# Patient Record
Sex: MALE | NOT HISPANIC OR LATINO | URBAN - METROPOLITAN AREA
[De-identification: names, ages, dates, MRNs, and addresses within clinical notes are randomized per-mention and may not be internally consistent; named-entity substitution may affect disease eponyms.]

---

## 2021-12-12 ENCOUNTER — INPATIENT (INPATIENT)
Facility: HOSPITAL | Age: 36
LOS: 1 days | Discharge: HOME | DRG: 342 | End: 2021-12-14
Attending: SURGERY | Admitting: SURGERY
Payer: COMMERCIAL

## 2021-12-12 VITALS
OXYGEN SATURATION: 96 % | RESPIRATION RATE: 18 BRPM | HEART RATE: 85 BPM | SYSTOLIC BLOOD PRESSURE: 102 MMHG | HEIGHT: 66 IN | DIASTOLIC BLOOD PRESSURE: 69 MMHG | WEIGHT: 154.98 LBS | TEMPERATURE: 99 F

## 2021-12-12 LAB
ALBUMIN SERPL ELPH-MCNC: 4.8 G/DL — SIGNIFICANT CHANGE UP (ref 3.3–5)
ALP SERPL-CCNC: 105 U/L — SIGNIFICANT CHANGE UP (ref 40–120)
ALT FLD-CCNC: 130 U/L — HIGH (ref 10–45)
ANION GAP SERPL CALC-SCNC: 13 MMOL/L — SIGNIFICANT CHANGE UP (ref 5–17)
ANISOCYTOSIS BLD QL: SLIGHT — SIGNIFICANT CHANGE UP
APPEARANCE UR: ABNORMAL
APTT BLD: 29.9 SEC — SIGNIFICANT CHANGE UP (ref 27.5–35.5)
AST SERPL-CCNC: 112 U/L — HIGH (ref 10–40)
BACTERIA # UR AUTO: PRESENT /HPF
BASOPHILS # BLD AUTO: 0.1 K/UL — SIGNIFICANT CHANGE UP (ref 0–0.2)
BASOPHILS NFR BLD AUTO: 0.9 % — SIGNIFICANT CHANGE UP (ref 0–2)
BILIRUB DIRECT SERPL-MCNC: 0.3 MG/DL — SIGNIFICANT CHANGE UP (ref 0–0.3)
BILIRUB INDIRECT FLD-MCNC: 1.1 MG/DL — HIGH (ref 0.2–1)
BILIRUB SERPL-MCNC: 1.4 MG/DL — HIGH (ref 0.2–1.2)
BILIRUB SERPL-MCNC: 1.5 MG/DL — HIGH (ref 0.2–1.2)
BILIRUB UR-MCNC: NEGATIVE — SIGNIFICANT CHANGE UP
BLD GP AB SCN SERPL QL: NEGATIVE — SIGNIFICANT CHANGE UP
BUN SERPL-MCNC: 19 MG/DL — SIGNIFICANT CHANGE UP (ref 7–23)
CALCIUM SERPL-MCNC: 9.5 MG/DL — SIGNIFICANT CHANGE UP (ref 8.4–10.5)
CHLORIDE SERPL-SCNC: 98 MMOL/L — SIGNIFICANT CHANGE UP (ref 96–108)
CO2 SERPL-SCNC: 28 MMOL/L — SIGNIFICANT CHANGE UP (ref 22–31)
COLOR SPEC: YELLOW — SIGNIFICANT CHANGE UP
CREAT SERPL-MCNC: 1.11 MG/DL — SIGNIFICANT CHANGE UP (ref 0.5–1.3)
DACRYOCYTES BLD QL SMEAR: SLIGHT — SIGNIFICANT CHANGE UP
DIFF PNL FLD: NEGATIVE — SIGNIFICANT CHANGE UP
EOSINOPHIL # BLD AUTO: 0 K/UL — SIGNIFICANT CHANGE UP (ref 0–0.5)
EOSINOPHIL NFR BLD AUTO: 0 % — SIGNIFICANT CHANGE UP (ref 0–6)
EPI CELLS # UR: SIGNIFICANT CHANGE UP /HPF (ref 0–5)
GIANT PLATELETS BLD QL SMEAR: PRESENT — SIGNIFICANT CHANGE UP
GLUCOSE SERPL-MCNC: 107 MG/DL — HIGH (ref 70–99)
GLUCOSE UR QL: NEGATIVE — SIGNIFICANT CHANGE UP
HCT VFR BLD CALC: 42.6 % — SIGNIFICANT CHANGE UP (ref 39–50)
HGB BLD-MCNC: 15 G/DL — SIGNIFICANT CHANGE UP (ref 13–17)
HIV 1+2 AB+HIV1 P24 AG SERPL QL IA: SIGNIFICANT CHANGE UP
INR BLD: 1.19 — HIGH (ref 0.88–1.16)
KETONES UR-MCNC: 15 MG/DL
LEUKOCYTE ESTERASE UR-ACNC: NEGATIVE — SIGNIFICANT CHANGE UP
LIDOCAIN IGE QN: 10 U/L — SIGNIFICANT CHANGE UP (ref 7–60)
LYMPHOCYTES # BLD AUTO: 0.59 K/UL — LOW (ref 1–3.3)
LYMPHOCYTES # BLD AUTO: 5.2 % — LOW (ref 13–44)
MANUAL SMEAR VERIFICATION: SIGNIFICANT CHANGE UP
MCHC RBC-ENTMCNC: 29.9 PG — SIGNIFICANT CHANGE UP (ref 27–34)
MCHC RBC-ENTMCNC: 35.2 GM/DL — SIGNIFICANT CHANGE UP (ref 32–36)
MCV RBC AUTO: 85 FL — SIGNIFICANT CHANGE UP (ref 80–100)
MICROCYTES BLD QL: SLIGHT — SIGNIFICANT CHANGE UP
MONOCYTES # BLD AUTO: 0.19 K/UL — SIGNIFICANT CHANGE UP (ref 0–0.9)
MONOCYTES NFR BLD AUTO: 1.7 % — LOW (ref 2–14)
NEUTROPHILS # BLD AUTO: 10.01 K/UL — HIGH (ref 1.8–7.4)
NEUTROPHILS NFR BLD AUTO: 73.9 % — SIGNIFICANT CHANGE UP (ref 43–77)
NEUTS BAND # BLD: 14.8 % — HIGH (ref 0–8)
NITRITE UR-MCNC: NEGATIVE — SIGNIFICANT CHANGE UP
PH UR: 6 — SIGNIFICANT CHANGE UP (ref 5–8)
PLAT MORPH BLD: ABNORMAL
PLATELET # BLD AUTO: 121 K/UL — LOW (ref 150–400)
POIKILOCYTOSIS BLD QL AUTO: SLIGHT — SIGNIFICANT CHANGE UP
POLYCHROMASIA BLD QL SMEAR: SLIGHT — SIGNIFICANT CHANGE UP
POTASSIUM SERPL-MCNC: 3.8 MMOL/L — SIGNIFICANT CHANGE UP (ref 3.5–5.3)
POTASSIUM SERPL-SCNC: 3.8 MMOL/L — SIGNIFICANT CHANGE UP (ref 3.5–5.3)
PROT SERPL-MCNC: 8 G/DL — SIGNIFICANT CHANGE UP (ref 6–8.3)
PROT UR-MCNC: ABNORMAL MG/DL
PROTHROM AB SERPL-ACNC: 14.2 SEC — HIGH (ref 10.6–13.6)
RBC # BLD: 5.01 M/UL — SIGNIFICANT CHANGE UP (ref 4.2–5.8)
RBC # FLD: 12.3 % — SIGNIFICANT CHANGE UP (ref 10.3–14.5)
RBC BLD AUTO: ABNORMAL
RBC CASTS # UR COMP ASSIST: 0 /HPF — SIGNIFICANT CHANGE UP
RH IG SCN BLD-IMP: POSITIVE — SIGNIFICANT CHANGE UP
RH IG SCN BLD-IMP: POSITIVE — SIGNIFICANT CHANGE UP
SARS-COV-2 RNA SPEC QL NAA+PROBE: SIGNIFICANT CHANGE UP
SODIUM SERPL-SCNC: 139 MMOL/L — SIGNIFICANT CHANGE UP (ref 135–145)
SP GR SPEC: 1.02 — SIGNIFICANT CHANGE UP (ref 1–1.03)
SPHEROCYTES BLD QL SMEAR: SLIGHT — SIGNIFICANT CHANGE UP
UROBILINOGEN FLD QL: 0.2 E.U./DL — SIGNIFICANT CHANGE UP
VARIANT LYMPHS # BLD: 3.5 % — SIGNIFICANT CHANGE UP (ref 0–6)
WBC # BLD: 11.28 K/UL — HIGH (ref 3.8–10.5)
WBC # FLD AUTO: 11.28 K/UL — HIGH (ref 3.8–10.5)
WBC UR QL: < 5 /HPF — SIGNIFICANT CHANGE UP

## 2021-12-12 PROCEDURE — 74177 CT ABD & PELVIS W/CONTRAST: CPT | Mod: 26,MA

## 2021-12-12 PROCEDURE — 99285 EMERGENCY DEPT VISIT HI MDM: CPT

## 2021-12-12 RX ORDER — IOHEXOL 300 MG/ML
30 INJECTION, SOLUTION INTRAVENOUS ONCE
Refills: 0 | Status: COMPLETED | OUTPATIENT
Start: 2021-12-12 | End: 2021-12-12

## 2021-12-12 RX ORDER — HEPARIN SODIUM 5000 [USP'U]/ML
5000 INJECTION INTRAVENOUS; SUBCUTANEOUS EVERY 8 HOURS
Refills: 0 | Status: DISCONTINUED | OUTPATIENT
Start: 2021-12-12 | End: 2021-12-13

## 2021-12-12 RX ORDER — CEFTRIAXONE 500 MG/1
1000 INJECTION, POWDER, FOR SOLUTION INTRAMUSCULAR; INTRAVENOUS ONCE
Refills: 0 | Status: COMPLETED | OUTPATIENT
Start: 2021-12-12 | End: 2021-12-12

## 2021-12-12 RX ORDER — ONDANSETRON 8 MG/1
4 TABLET, FILM COATED ORAL ONCE
Refills: 0 | Status: COMPLETED | OUTPATIENT
Start: 2021-12-12 | End: 2021-12-12

## 2021-12-12 RX ORDER — ACETAMINOPHEN 500 MG
1000 TABLET ORAL ONCE
Refills: 0 | Status: COMPLETED | OUTPATIENT
Start: 2021-12-12 | End: 2021-12-12

## 2021-12-12 RX ORDER — METRONIDAZOLE 500 MG
500 TABLET ORAL ONCE
Refills: 0 | Status: COMPLETED | OUTPATIENT
Start: 2021-12-12 | End: 2021-12-12

## 2021-12-12 RX ORDER — CEFTRIAXONE 500 MG/1
1000 INJECTION, POWDER, FOR SOLUTION INTRAMUSCULAR; INTRAVENOUS EVERY 24 HOURS
Refills: 0 | Status: DISCONTINUED | OUTPATIENT
Start: 2021-12-13 | End: 2021-12-14

## 2021-12-12 RX ORDER — METRONIDAZOLE 500 MG
500 TABLET ORAL EVERY 8 HOURS
Refills: 0 | Status: DISCONTINUED | OUTPATIENT
Start: 2021-12-13 | End: 2021-12-14

## 2021-12-12 RX ORDER — SODIUM CHLORIDE 9 MG/ML
1000 INJECTION INTRAMUSCULAR; INTRAVENOUS; SUBCUTANEOUS ONCE
Refills: 0 | Status: COMPLETED | OUTPATIENT
Start: 2021-12-12 | End: 2021-12-12

## 2021-12-12 RX ORDER — SODIUM CHLORIDE 9 MG/ML
1000 INJECTION, SOLUTION INTRAVENOUS
Refills: 0 | Status: DISCONTINUED | OUTPATIENT
Start: 2021-12-12 | End: 2021-12-14

## 2021-12-12 RX ORDER — ACETAMINOPHEN 500 MG
650 TABLET ORAL EVERY 6 HOURS
Refills: 0 | Status: DISCONTINUED | OUTPATIENT
Start: 2021-12-12 | End: 2021-12-14

## 2021-12-12 RX ADMIN — Medication 400 MILLIGRAM(S): at 16:40

## 2021-12-12 RX ADMIN — SODIUM CHLORIDE 1000 MILLILITER(S): 9 INJECTION INTRAMUSCULAR; INTRAVENOUS; SUBCUTANEOUS at 16:03

## 2021-12-12 RX ADMIN — SODIUM CHLORIDE 110 MILLILITER(S): 9 INJECTION, SOLUTION INTRAVENOUS at 22:47

## 2021-12-12 RX ADMIN — CEFTRIAXONE 100 MILLIGRAM(S): 500 INJECTION, POWDER, FOR SOLUTION INTRAMUSCULAR; INTRAVENOUS at 18:39

## 2021-12-12 RX ADMIN — HEPARIN SODIUM 5000 UNIT(S): 5000 INJECTION INTRAVENOUS; SUBCUTANEOUS at 22:47

## 2021-12-12 RX ADMIN — SODIUM CHLORIDE 110 MILLILITER(S): 9 INJECTION, SOLUTION INTRAVENOUS at 19:34

## 2021-12-12 RX ADMIN — Medication 1000 MILLIGRAM(S): at 17:00

## 2021-12-12 RX ADMIN — IOHEXOL 30 MILLILITER(S): 300 INJECTION, SOLUTION INTRAVENOUS at 16:03

## 2021-12-12 RX ADMIN — ONDANSETRON 4 MILLIGRAM(S): 8 TABLET, FILM COATED ORAL at 16:40

## 2021-12-12 RX ADMIN — Medication 100 MILLIGRAM(S): at 19:34

## 2021-12-12 NOTE — ED ADULT TRIAGE NOTE - CHIEF COMPLAINT QUOTE
pt BIBA from home for RLQ pain x 2 days and fever at 101 today, denies any n, v, d, last BM today, denies any medical issues, no antipyretics PTA I have personally seen and examined this patient. I have fully participated in the care of the patient. I have reviewed all pertinent clinical information, including history, physical exam, plan and the Resident's note and agree except as noted in the MDM.

## 2021-12-12 NOTE — ED PROVIDER NOTE - PHYSICAL EXAMINATION
VITAL SIGNS: I have reviewed nursing notes and confirm.  CONSTITUTIONAL: Non toxic; in no acute distress.   SKIN:  Warm and dry, no acute rash.   HEAD:  Normocephalic, atraumatic.  EYES: PERRL.  EOM intact; conjunctiva and sclera clear.  ENT: No nasal discharge; airway clear.   NECK: Supple; non tender.  CARD: S1, S2 normal; no murmurs, gallops, or rubs. Regular rate and rhythm.   RESP:  Clear to auscultation b/l, no wheezes, rales or rhonchi.  ABD: Normal bowel sounds; soft; non-distended; + TTP over RLQ; no guarding/rebound.  EXT: Normal ROM. No clubbing, cyanosis or edema. 2+ pulses to b/l ue/le.  NEURO: Alert, oriented, grossly unremarkable.  5/5 strength x 4 extremities against gravity and external force.  No drift x 4 extremities.  Sensation intact and symmetric x 4 extremities.  No facial asymmetry.    PSYCH: Cooperative, mood and affect appropriate.

## 2021-12-12 NOTE — ED PROVIDER NOTE - CLINICAL SUMMARY MEDICAL DECISION MAKING FREE TEXT BOX
Patient in ED w concern for RLQ abd pain - sent to ED by his PCP, Dr. Salguero for r/o appendicitis.  Patient non toxic with point tenderness to RLQ on exam.  Labs, imaging ordered and surgical team in ED to evaluate patient as they were made aware by patient's primary team. Patient in ED w concern for RLQ abd pain - sent to ED by his PCP, Dr. Salguero for r/o appendicitis.  Patient non toxic with point tenderness to RLQ on exam.  Labs, imaging ordered and surgical team in ED to evaluate patient as they were made aware by patient's primary team.  CT results reviewed and acute appendicitis noted.  IV abx therapy initiated in ED.  Patient to be admitted to Dr. Mason for appendectomy at this time.  Patient is aware of plan and in agreement.

## 2021-12-12 NOTE — CHART NOTE - NSCHARTNOTEFT_GEN_A_CORE
Chief Surgery Resident's Note:    36M, healthy, presenting with acute appendicitis. 2 days of pain. Vitals stable. RLQ tenderness. WBC 11.1. CT confirms acute appendicitis and right inguinal hernia containing fat. Plan: NPO, IV, preoperative workup, abx, add on for appendectomy. Discussed with surgical attending on call.

## 2021-12-12 NOTE — ED ADULT NURSE NOTE - NSIMPLEMENTINTERV_GEN_ALL_ED
Implemented All Universal Safety Interventions:  [place] to call system. Call bell, personal items and telephone within reach. Instruct patient to call for assistance. Room bathroom lighting operational. Non-slip footwear when patient is off stretcher. Physically safe environment: no spills, clutter or unnecessary equipment. Stretcher in lowest position, wheels locked, appropriate side rails in place.

## 2021-12-12 NOTE — ED ADULT NURSE NOTE - CHPI ED NUR SYMPTOMS NEG
no abdominal distension/no blood in stool/no burning urination/no chills/no dysuria/no hematuria/no vomiting

## 2021-12-12 NOTE — PATIENT PROFILE ADULT - FALL HARM RISK - UNIVERSAL INTERVENTIONS
Bed in lowest position, wheels locked, appropriate side rails in place/Call bell, personal items and telephone in reach/Instruct patient to call for assistance before getting out of bed or chair/Non-slip footwear when patient is out of bed/[place] to call system/Physically safe environment - no spills, clutter or unnecessary equipment/Purposeful Proactive Rounding/Room/bathroom lighting operational, light cord in reach

## 2021-12-12 NOTE — H&P ADULT - NSHPLABSRESULTS_GEN_ALL_CORE
LABS:                        15.0   11.28 )-----------( 121      ( 12 Dec 2021 16:08 )             42.6     12-12    139  |  98  |  19  ----------------------------<  107<H>  3.8   |  28  |  1.11    Ca    9.5      12 Dec 2021 16:08    TPro  8.0  /  Alb  4.8  /  TBili  1.5<H>  /  DBili  x   /  AST  112<H>  /  ALT  130<H>  /  AlkPhos  105  12-12    PT/INR - ( 12 Dec 2021 18:00 )   PT: 14.2 sec;   INR: 1.19          PTT - ( 12 Dec 2021 18:00 )  PTT:29.9 sec      RADIOLOGY & ADDITIONAL STUDIES:  CT Abdomen and Pelvis w/ Oral Cont and w/ IV Cont:   ACC: [id number] EXAM:  CT ABDOMEN AND PELVIS OC IC                          PROCEDURE DATE:  12/12/2021          INTERPRETATION:  CLINICAL INFORMATION: Abnormal pain    COMPARISON: None.    PROCEDURE:  CT of the Abdomen and Pelvis was performed with intravenous contrast.  Intravenous contrast: 90 ml Omnipaque 350. 10 ml discarded.  Oral contrast: positive contrast was administered.  Sagittal and coronal reformats were performed.    FINDINGS:    Lower chest: Within normal limits.    Liver: Normal in size and morphology. No focal abnormality. The main   portal vein is patent.  Gallbladder and biliary ducts: No gallstones. No intra/extrahepatic   biliary ductal dilatation.  Spleen: Within normal limits.  Pancreas: Within normal limits.  Adrenals: Within normal limits.  Kidneys/ureters: No hydronephrosis. No urinary calculi. No focal   parenchymal abnormality.    Bladder: Within normal limits.  Reproductive organs: Prostate within normal limits.    Bowel: The bowel is normal in caliber. Diffusely dilated appendix   opacified with fluid, measuring up to 1.3 cm in caliber, demonstrates   diffuse wall thickening and periappendiceal fat stranding.  Peritoneum/retroperitoneum: No ascites. No periappendiceal fluid   collection or pneumoperitoneum.  Vasculature:  The aorta and its branches are normal in caliber.  Lymph nodes: Lymph nodes are not enlarged.  Bones and soft tissue: Bilateral inguinal hernia containing fat..      IMPRESSION:  Acute appendicitis. No collection or pneumoperitoneum.    --- End of Report ---            SEBASTIEN BOBBY MD; Attending Radiologist  This document has been electronically signed. Dec 12 2021  5:44PM (12-12-21 @ 17:23)

## 2021-12-12 NOTE — ED PROVIDER NOTE - OBJECTIVE STATEMENT
36 year old male with no past medical history presents to ED with concern for RLQ abdominal pain x several days.  Patient notes some associated nausea and emesis at symptom onset, however is now just feeling decreased appetite.  He notes last BM today and diarrhea.  Patient denies associated fever, chills, headache, visual changes, chest pain, shortness of breath, urinary symptoms, peripheral edema, calf pain/tenderness, recent travel, known sick contacts or any additional acute complaints or concerns at this time.

## 2021-12-12 NOTE — H&P ADULT - HISTORY OF PRESENT ILLNESS
35 yo male with no significant PMH or PSH who presents with 3 day history of RLQ abdominal pain. Pain began early morning on Friday with RLQ sharp, non-radiating pain. Pain is worsened with movement and improves with rest. He initially attributed symptoms to Gastroenteritis vs gas pain and took gas x/tylenol without much relief. Pain continued and he became nauseous and had 2 episode NBNB emesis on Friday night and early Saturday morning. He has not had an appetite and has not had anything since Friday. He endorses tactile fevers, chills, but denies, SOB or chest pain, diarrhea, or urinary symptoms. Last BM on Friday but continues to pass minimal flatus. He recently had colonoscopy back in august for routine screening given his background and states was normal.; No history of cancer or prior hernias. no other concerns or complaints.    In the ED, patient is afebrile, non tachy, with soft pressure of 102/69; labs with WBC of11.28, Hg 15, and platelet of 121, lactate of _, normal electrolytes, Cr 1.11, TB of 1.5 and mild LFT elevation; CT A/P demonstrated dilated appendix opacified with fluid, measuring up to 1.3 cm in caliber, demonstrates diffuse wall thickening and periappendiceal fat stranding.      PMH: none  PSH: none  allergies: none  meds: none  SH: no tobacco, alcohol or recreational drug use

## 2021-12-12 NOTE — H&P ADULT - NSHPPHYSICALEXAM_GEN_ALL_CORE
GENERAL: NAD,  HEENT: NCAT, MMM, Normal conjunctiva, PERRL  RESP: Nonlabored breathing, No respiratory distress  CARD: Normal rate, Normal peripheral perfusion  GI: Soft, ND, RLQ tenderness at McBurney's point, mild paraumbilical tenderness; negative Rovsing's, Psoas, and Obturator signs; No guarding, No rebound tenderness, no prior surgical scars, no signs of hernias   EXTREM: WWP, No edema, No gross deformity of extremities  NEURO: AAOx3, No focal motor or sensory deficits  PSYCH: Affect and characteristics of appearance, verbalizations, and behaviors are appropriate

## 2021-12-12 NOTE — H&P ADULT - ASSESSMENT
35 yo male with no significant PMH or PSH who presents with 3 day history of RLQ abdominal pain, associated with tactile fevers, chills, n/v and anorexia; currently afebrile, non-tachy, with soft pressure of 102/69 and PE with RLQ tenderness; WBC of11.28,  lactate of _, Cr 1.11, TB of 1.5 and mild LFT elevation; CT A/P demonstrated acute appendicitis;     plan:   admit to regional  NPO/IVF/OOB/IS  continue ceftriaxone and flagyl   pain/nausea control  plan for OR tonight for lap appendectomy   follow up for direct/indirect bilirubin   ppx scd/Heparin   team 4 surgery will continue to follow   plan discussed with chief and attending on call

## 2021-12-12 NOTE — ED PROVIDER NOTE - NS ED ROS FT
Constitutional: No fever or chills.   Eyes: No pain, blurry vision, or discharge.  ENMT: No hearing changes, pain, discharge or infections. No neck pain or stiffness.  Cardiac: No chest pain, SOB or edema. No chest pain with exertion.  Respiratory: No cough or respiratory distress. No hemoptysis. No history of asthma or RAD.  GI: + intermittent (now resolved) nausea and vomiting, + diarrhea , + RLQ abdominal pain.  : No dysuria, frequency or burning.  MS: No myalgia, muscle weakness, joint pain or back pain.  Neuro: No headache or weakness. No LOC.  Skin: No skin rash.   Endocrine: No history of thyroid disease or diabetes.  Except as documented in the HPI, all other systems are negative.

## 2021-12-12 NOTE — ED ADULT NURSE NOTE - CHIEF COMPLAINT QUOTE
pt BIBA from home for RLQ pain x 2 days and fever at 101 today, denies any n, v, d, last BM today, denies any medical issues, no antipyretics PTA

## 2021-12-12 NOTE — ED ADULT NURSE NOTE - OBJECTIVE STATEMENT
Pt A&Ox4, ambulatory with steady gait, speaking in clear/full sentences, no acute distress, vital signs stable. Pt presents to the ED for RLQ abd pain x 3 days. Pt reports constipation for the last few days. Pt reports 2 episodes of vomiting yesterday and low grade fevers at home tmax 99.9. Pt denies blood in stool, chills, hematuria.

## 2021-12-13 LAB
ALBUMIN SERPL ELPH-MCNC: 3.6 G/DL — SIGNIFICANT CHANGE UP (ref 3.3–5)
ALP SERPL-CCNC: 85 U/L — SIGNIFICANT CHANGE UP (ref 40–120)
ALT FLD-CCNC: 75 U/L — HIGH (ref 10–45)
ANION GAP SERPL CALC-SCNC: 11 MMOL/L — SIGNIFICANT CHANGE UP (ref 5–17)
ANION GAP SERPL CALC-SCNC: 13 MMOL/L — SIGNIFICANT CHANGE UP (ref 5–17)
AST SERPL-CCNC: 46 U/L — HIGH (ref 10–40)
BILIRUB SERPL-MCNC: 0.7 MG/DL — SIGNIFICANT CHANGE UP (ref 0.2–1.2)
BUN SERPL-MCNC: 17 MG/DL — SIGNIFICANT CHANGE UP (ref 7–23)
BUN SERPL-MCNC: 17 MG/DL — SIGNIFICANT CHANGE UP (ref 7–23)
CALCIUM SERPL-MCNC: 8.5 MG/DL — SIGNIFICANT CHANGE UP (ref 8.4–10.5)
CALCIUM SERPL-MCNC: 8.9 MG/DL — SIGNIFICANT CHANGE UP (ref 8.4–10.5)
CHLORIDE SERPL-SCNC: 100 MMOL/L — SIGNIFICANT CHANGE UP (ref 96–108)
CHLORIDE SERPL-SCNC: 99 MMOL/L — SIGNIFICANT CHANGE UP (ref 96–108)
CO2 SERPL-SCNC: 25 MMOL/L — SIGNIFICANT CHANGE UP (ref 22–31)
CO2 SERPL-SCNC: 25 MMOL/L — SIGNIFICANT CHANGE UP (ref 22–31)
CREAT SERPL-MCNC: 0.95 MG/DL — SIGNIFICANT CHANGE UP (ref 0.5–1.3)
CREAT SERPL-MCNC: 0.98 MG/DL — SIGNIFICANT CHANGE UP (ref 0.5–1.3)
CULTURE RESULTS: SIGNIFICANT CHANGE UP
GLUCOSE SERPL-MCNC: 123 MG/DL — HIGH (ref 70–99)
GLUCOSE SERPL-MCNC: 89 MG/DL — SIGNIFICANT CHANGE UP (ref 70–99)
HCT VFR BLD CALC: 37 % — LOW (ref 39–50)
HCT VFR BLD CALC: 38.3 % — LOW (ref 39–50)
HGB BLD-MCNC: 12.6 G/DL — LOW (ref 13–17)
HGB BLD-MCNC: 12.9 G/DL — LOW (ref 13–17)
MAGNESIUM SERPL-MCNC: 1.8 MG/DL — SIGNIFICANT CHANGE UP (ref 1.6–2.6)
MAGNESIUM SERPL-MCNC: 2.2 MG/DL — SIGNIFICANT CHANGE UP (ref 1.6–2.6)
MCHC RBC-ENTMCNC: 29 PG — SIGNIFICANT CHANGE UP (ref 27–34)
MCHC RBC-ENTMCNC: 29.4 PG — SIGNIFICANT CHANGE UP (ref 27–34)
MCHC RBC-ENTMCNC: 33.7 GM/DL — SIGNIFICANT CHANGE UP (ref 32–36)
MCHC RBC-ENTMCNC: 34.1 GM/DL — SIGNIFICANT CHANGE UP (ref 32–36)
MCV RBC AUTO: 86.1 FL — SIGNIFICANT CHANGE UP (ref 80–100)
MCV RBC AUTO: 86.4 FL — SIGNIFICANT CHANGE UP (ref 80–100)
NRBC # BLD: 0 /100 WBCS — SIGNIFICANT CHANGE UP (ref 0–0)
NRBC # BLD: 0 /100 WBCS — SIGNIFICANT CHANGE UP (ref 0–0)
PHOSPHATE SERPL-MCNC: 2.4 MG/DL — LOW (ref 2.5–4.5)
PHOSPHATE SERPL-MCNC: 3.2 MG/DL — SIGNIFICANT CHANGE UP (ref 2.5–4.5)
PLATELET # BLD AUTO: 108 K/UL — LOW (ref 150–400)
PLATELET # BLD AUTO: 79 K/UL — LOW (ref 150–400)
POTASSIUM SERPL-MCNC: 3.5 MMOL/L — SIGNIFICANT CHANGE UP (ref 3.5–5.3)
POTASSIUM SERPL-MCNC: 4 MMOL/L — SIGNIFICANT CHANGE UP (ref 3.5–5.3)
POTASSIUM SERPL-SCNC: 3.5 MMOL/L — SIGNIFICANT CHANGE UP (ref 3.5–5.3)
POTASSIUM SERPL-SCNC: 4 MMOL/L — SIGNIFICANT CHANGE UP (ref 3.5–5.3)
PROT SERPL-MCNC: 5.9 G/DL — LOW (ref 6–8.3)
RBC # BLD: 4.28 M/UL — SIGNIFICANT CHANGE UP (ref 4.2–5.8)
RBC # BLD: 4.45 M/UL — SIGNIFICANT CHANGE UP (ref 4.2–5.8)
RBC # FLD: 11.9 % — SIGNIFICANT CHANGE UP (ref 10.3–14.5)
RBC # FLD: 12 % — SIGNIFICANT CHANGE UP (ref 10.3–14.5)
SODIUM SERPL-SCNC: 135 MMOL/L — SIGNIFICANT CHANGE UP (ref 135–145)
SODIUM SERPL-SCNC: 138 MMOL/L — SIGNIFICANT CHANGE UP (ref 135–145)
SPECIMEN SOURCE: SIGNIFICANT CHANGE UP
WBC # BLD: 5.31 K/UL — SIGNIFICANT CHANGE UP (ref 3.8–10.5)
WBC # BLD: 6.32 K/UL — SIGNIFICANT CHANGE UP (ref 3.8–10.5)
WBC # FLD AUTO: 5.31 K/UL — SIGNIFICANT CHANGE UP (ref 3.8–10.5)
WBC # FLD AUTO: 6.32 K/UL — SIGNIFICANT CHANGE UP (ref 3.8–10.5)

## 2021-12-13 PROCEDURE — 88304 TISSUE EXAM BY PATHOLOGIST: CPT | Mod: 26

## 2021-12-13 PROCEDURE — 99223 1ST HOSP IP/OBS HIGH 75: CPT

## 2021-12-13 RX ORDER — HEPARIN SODIUM 5000 [USP'U]/ML
5000 INJECTION INTRAVENOUS; SUBCUTANEOUS EVERY 8 HOURS
Refills: 0 | Status: DISCONTINUED | OUTPATIENT
Start: 2021-12-13 | End: 2021-12-14

## 2021-12-13 RX ORDER — BUPIVACAINE 13.3 MG/ML
20 INJECTION, SUSPENSION, LIPOSOMAL INFILTRATION ONCE
Refills: 0 | Status: DISCONTINUED | OUTPATIENT
Start: 2021-12-13 | End: 2021-12-14

## 2021-12-13 RX ORDER — HEPARIN SODIUM 5000 [USP'U]/ML
5000 INJECTION INTRAVENOUS; SUBCUTANEOUS EVERY 8 HOURS
Refills: 0 | Status: DISCONTINUED | OUTPATIENT
Start: 2021-12-13 | End: 2021-12-13

## 2021-12-13 RX ADMIN — Medication 100 MILLIGRAM(S): at 18:32

## 2021-12-13 RX ADMIN — Medication 100 MILLIGRAM(S): at 11:49

## 2021-12-13 RX ADMIN — SODIUM CHLORIDE 110 MILLILITER(S): 9 INJECTION, SOLUTION INTRAVENOUS at 23:07

## 2021-12-13 RX ADMIN — CEFTRIAXONE 100 MILLIGRAM(S): 500 INJECTION, POWDER, FOR SOLUTION INTRAMUSCULAR; INTRAVENOUS at 17:48

## 2021-12-13 RX ADMIN — HEPARIN SODIUM 5000 UNIT(S): 5000 INJECTION INTRAVENOUS; SUBCUTANEOUS at 17:48

## 2021-12-13 RX ADMIN — HEPARIN SODIUM 5000 UNIT(S): 5000 INJECTION INTRAVENOUS; SUBCUTANEOUS at 05:26

## 2021-12-13 RX ADMIN — Medication 100 MILLIGRAM(S): at 03:30

## 2021-12-13 RX ADMIN — SODIUM CHLORIDE 110 MILLILITER(S): 9 INJECTION, SOLUTION INTRAVENOUS at 03:30

## 2021-12-13 NOTE — CONSULT NOTE ADULT - SUBJECTIVE AND OBJECTIVE BOX
HPI:  35 yo male with no significant PMH or PSH who presents with 3 day history of RLQ abdominal pain. Pain began early morning on Friday with RLQ sharp, non-radiating pain. Pain is worsened with movement and improves with rest. He initially attributed symptoms to Gastroenteritis vs gas pain and took gas x/tylenol without much relief. Pain continued and he became nauseous and had 2 episode NBNB emesis on Friday night and early Saturday morning. He has not had an appetite and has not had anything since Friday. He endorses tactile fevers, chills, but denies, SOB or chest pain, diarrhea, or urinary symptoms. Last BM on Friday but continues to pass minimal flatus. He recently had colonoscopy back in august for routine screening given his background and states was normal.; No history of cancer or prior hernias. no other concerns or complaints.    In the ED, patient is afebrile, non tachy, with soft pressure of 102/69; labs with WBC of11.28, Hg 15, and platelet of 121, lactate of _, normal electrolytes, Cr 1.11, TB of 1.5 and mild LFT elevation; CT A/P demonstrated dilated appendix opacified with fluid, measuring up to 1.3 cm in caliber, demonstrates diffuse wall thickening and periappendiceal fat stranding.        Underwent laparoscopic appendectomy on 12/13.   At time of evaluation by internal medicine service post-op, was not having any nausea. Having mild abdominal tenderness post-op. Eager to resume PO intake        Past Medical History: none  PSurgical history: none  allergies: none  home meds meds: none  Social Hx: no tobacco, alcohol or recreational drug use   Family Hx: no history of colorectal cancer or myocardial infarction in father or in mother      (12 Dec 2021 18:21)      PAST MEDICAL & SURGICAL HISTORY:  No pertinent past medical history    No significant past surgical history        Home Medications:      Allergies    No Known Allergies    Intolerances        FAMILY HISTORY:  No pertinent family history in first degree relatives        Social History:  no alcohol, tobacco, or drug use (12 Dec 2021 18:21)      REVIEW OF SYSTEMS:  CONSTITUTIONAL: No fever, weight loss  EYES: No eye pain, visual disturbances, or discharge  ENMT:  No difficulty hearing, tinnitus, vertigo; No throat pain  NECK: No pain or stiffness  RESPIRATORY: No cough, wheezing, or hemoptysis; No dyspnea  CARDIOVASCULAR: No chest pain, palpitations, dizziness, or leg swelling  GASTROINTESTINAL: Abdominal symptoms as per HPI.   GENITOURINARY: No dysuria, frequency, or hematuria  NEUROLOGICAL: No headaches, memory loss, numbness, or tremors  SKIN: No itching, burning, rashes, or lesions   LYMPH NODES: No enlarged glands  ENDOCRINE: No heat or cold intolerance;  MUSCULOSKELETAL: No joint pain or swelling;   PSYCHIATRIC: No mood swings, or difficulty sleeping  HEME/LYMPH: No easy bruising, or bleeding gums  ALLERGY AND IMMUNOLOGIC: No hives or eczema    CURRENT MEDICATIONS:   acetaminophen     Tablet .. 650 milliGRAM(s) Oral every 6 hours PRN  BUpivacaine liposome 1.3% Injectable (no eMAR) 20 milliLiter(s) Local Injection once  cefTRIAXone   IVPB 1000 milliGRAM(s) IV Intermittent every 24 hours  heparin   Injectable 5000 Unit(s) SubCutaneous every 8 hours  lactated ringers. 1000 milliLiter(s) IV Continuous <Continuous>  metroNIDAZOLE  IVPB 500 milliGRAM(s) IV Intermittent every 8 hours      VITAL SIGNS, INS/OUTS (last 24 hours):  Vital Signs Last 24 Hrs  T(C): 36.4 (14 Dec 2021 14:25), Max: 36.6 (14 Dec 2021 00:54)  T(F): 97.5 (14 Dec 2021 14:25), Max: 97.8 (14 Dec 2021 00:54)  HR: 65 (14 Dec 2021 14:25) (55 - 65)  BP: 116/71 (14 Dec 2021 14:25) (106/66 - 126/76)  BP(mean): --  RR: 18 (14 Dec 2021 14:25) (16 - 18)  SpO2: 97% (14 Dec 2021 14:25) (97% - 98%)  I&O's Summary    13 Dec 2021 07:01  -  14 Dec 2021 07:00  --------------------------------------------------------  IN: 3000 mL / OUT: 1550 mL / NET: 1450 mL    14 Dec 2021 07:01  -  14 Dec 2021 22:11  --------------------------------------------------------  IN: 1570 mL / OUT: 550 mL / NET: 1020 mL        PHYSICAL EXAM:  Gen: Reclining in bed at time of exam, appears stated age  HEENT: NCAT, MMM, clear OP  Neck: supple, trachea at midline  CV: RRR, +S1/S2  Pulm: adequate respiratory effort, no increase in work of breathing  Abd: soft,  ND, incision sites clean, dry   Skin: warm and dry, no new rashes vs prior report  Ext: WWP, no LE edema  Neuro: AOx3, no gross focal neurological deficits  Psych: affect and behavior appropriate, pleasant at time of interview    BASIC LABS:                        11.9   5.93  )-----------( 105      ( 14 Dec 2021 06:51 )             35.2     12-14    137  |  103  |  14  ----------------------------<  109<H>  4.2   |  23  |  0.80    Ca    9.0      14 Dec 2021 06:51  Phos  3.5     12-14  Mg     2.3     12-14    TPro  6.1  /  Alb  3.5  /  TBili  0.3  /  DBili  x   /  AST  26  /  ALT  53<H>  /  AlkPhos  89  12-14        CAPILLARY BLOOD GLUCOSE          OTHER LABS:        MICRODATA:    Culture - Urine (collected 12 Dec 2021 18:28)  Source: Clean Catch Clean Catch (Midstream)  Final Report (13 Dec 2021 14:25):    Less than 10,000 cols/cc; Insignificant amount of growth.        IMAGING:    EKG:    #Diet - Diet, Regular:   Kosher (12-14-21 @ 14:23) [Active]        #DVT PPx -  #Dispo -

## 2021-12-13 NOTE — PACU DISCHARGE NOTE - COMMENTS
Verbal report given to EL Lundberg of [facility]. Incision sites clean, dry, and intact. Tolerating PO. Transported to floor.

## 2021-12-13 NOTE — CONSULT NOTE ADULT - ASSESSMENT
36 year old man admitted for acute appendicitis     # Acute appendicitis   CT scan of abdomen showing dilated appendix and periappendiceal fat stranding  Clinical picture and imaging consistent with acute appendicitis  s/p appendectomy today   Rest of management per primary team, advance diet as tolerated     # Leukocytosis   Likely 2/2 stress of acute appendicitis     # post-operative state  OOTB to chair   Encourage ambulation   Encourage incentive spirometry   Recommend miralax qd + senna PRN if patient develops constipation post-op    # Normocytic anemia   Mild anemia 12.6   Continue to trend Hgb     # Mild -moderate thrombocytopenia   Platelet count 79.   possibly in setting of acute stress ;   does not yet meet transfusion threshold.   trend plts    # Hypophosphatemia - Phosphorus Level, 2.4   ; Likely 2/2 poor PO intake Replete

## 2021-12-13 NOTE — BRIEF OPERATIVE NOTE - OPERATION/FINDINGS
Ren cutdown. Rest of ports placed under direct visualization. Appendix identified (non-perforated). Cecum bluntly mobilized. Mesoappendix divided using electrocautery. Appendix amputated at base near cecum using EndoGIA stapler. Stump inspected laparoscopically. Fascia closed w/ Vicryl sutures. skin closed with Monocryl.

## 2021-12-13 NOTE — PROGRESS NOTE ADULT - SUBJECTIVE AND OBJECTIVE BOX
General Surgery Post-Op Note    Procedure: Laparoscopic Appendectomy    Diagnosis/Indication: Acute appendicitis     Surgeon: Isabella    S: Pt has no complaints. Denies CP, SOB, SINGER, calf tenderness. Pain controlled with medication.    O:  T(C): 36.7 (12-13-21 @ 09:30), Max: 36.7 (12-13-21 @ 09:30)  T(F): 98 (12-13-21 @ 09:30), Max: 98 (12-13-21 @ 09:30)  HR: 71 (12-13-21 @ 09:30) (65 - 75)  BP: 107/65 (12-13-21 @ 09:30) (107/58 - 125/67)  RR: 11 (12-13-21 @ 09:30) (11 - 24)  SpO2: 96% (12-13-21 @ 09:30) (96% - 100%)  Wt(kg): --                        12.6   5.31  )-----------( 79       ( 13 Dec 2021 05:52 )             37.0     12-13    138  |  100  |  17  ----------------------------<  89  3.5   |  25  |  0.98    Ca    8.5      13 Dec 2021 05:52  Phos  2.4     12-13  Mg     1.8     12-13    TPro  5.9<L>  /  Alb  3.6  /  TBili  0.7  /  DBili  x   /  AST  46<H>  /  ALT  75<H>  /  AlkPhos  85  12-13      Gen: NAD, resting comfortably in bed  C/V: NSR  Pulm: Nonlabored breathing, no respiratory distress  Abd: soft, NT/ND  Extrem: WWP, no calf edema, SCDs in place      35 yo male with no significant PMH or PSH who presents with 3 day history of RLQ abdominal pain, associated with tactile fevers, chills, n/v and anorexia; currently afebrile, non-tachy, with soft pressure of 102/69 and PE with RLQ tenderness; WBC of11.28,  lactate of _, Cr 1.11, TB of 1.5 and mild LFT elevation; CT A/P demonstrated acute appendicitis; s/p lap appy      NPO/IVF

## 2021-12-14 VITALS
SYSTOLIC BLOOD PRESSURE: 116 MMHG | DIASTOLIC BLOOD PRESSURE: 71 MMHG | HEART RATE: 65 BPM | TEMPERATURE: 98 F | RESPIRATION RATE: 18 BRPM | OXYGEN SATURATION: 97 %

## 2021-12-14 LAB
ALBUMIN SERPL ELPH-MCNC: 3.5 G/DL — SIGNIFICANT CHANGE UP (ref 3.3–5)
ALP SERPL-CCNC: 89 U/L — SIGNIFICANT CHANGE UP (ref 40–120)
ALT FLD-CCNC: 53 U/L — HIGH (ref 10–45)
ANION GAP SERPL CALC-SCNC: 11 MMOL/L — SIGNIFICANT CHANGE UP (ref 5–17)
AST SERPL-CCNC: 26 U/L — SIGNIFICANT CHANGE UP (ref 10–40)
BILIRUB SERPL-MCNC: 0.3 MG/DL — SIGNIFICANT CHANGE UP (ref 0.2–1.2)
BUN SERPL-MCNC: 14 MG/DL — SIGNIFICANT CHANGE UP (ref 7–23)
CALCIUM SERPL-MCNC: 9 MG/DL — SIGNIFICANT CHANGE UP (ref 8.4–10.5)
CHLORIDE SERPL-SCNC: 103 MMOL/L — SIGNIFICANT CHANGE UP (ref 96–108)
CO2 SERPL-SCNC: 23 MMOL/L — SIGNIFICANT CHANGE UP (ref 22–31)
CREAT SERPL-MCNC: 0.8 MG/DL — SIGNIFICANT CHANGE UP (ref 0.5–1.3)
GLUCOSE SERPL-MCNC: 109 MG/DL — HIGH (ref 70–99)
HCT VFR BLD CALC: 35.2 % — LOW (ref 39–50)
HGB BLD-MCNC: 11.9 G/DL — LOW (ref 13–17)
MAGNESIUM SERPL-MCNC: 2.3 MG/DL — SIGNIFICANT CHANGE UP (ref 1.6–2.6)
MCHC RBC-ENTMCNC: 28.6 PG — SIGNIFICANT CHANGE UP (ref 27–34)
MCHC RBC-ENTMCNC: 33.8 GM/DL — SIGNIFICANT CHANGE UP (ref 32–36)
MCV RBC AUTO: 84.6 FL — SIGNIFICANT CHANGE UP (ref 80–100)
NRBC # BLD: 0 /100 WBCS — SIGNIFICANT CHANGE UP (ref 0–0)
PHOSPHATE SERPL-MCNC: 3.5 MG/DL — SIGNIFICANT CHANGE UP (ref 2.5–4.5)
PLATELET # BLD AUTO: 105 K/UL — LOW (ref 150–400)
POTASSIUM SERPL-MCNC: 4.2 MMOL/L — SIGNIFICANT CHANGE UP (ref 3.5–5.3)
POTASSIUM SERPL-SCNC: 4.2 MMOL/L — SIGNIFICANT CHANGE UP (ref 3.5–5.3)
PROT SERPL-MCNC: 6.1 G/DL — SIGNIFICANT CHANGE UP (ref 6–8.3)
RBC # BLD: 4.16 M/UL — LOW (ref 4.2–5.8)
RBC # FLD: 12.1 % — SIGNIFICANT CHANGE UP (ref 10.3–14.5)
SODIUM SERPL-SCNC: 137 MMOL/L — SIGNIFICANT CHANGE UP (ref 135–145)
WBC # BLD: 5.93 K/UL — SIGNIFICANT CHANGE UP (ref 3.8–10.5)
WBC # FLD AUTO: 5.93 K/UL — SIGNIFICANT CHANGE UP (ref 3.8–10.5)

## 2021-12-14 PROCEDURE — 99232 SBSQ HOSP IP/OBS MODERATE 35: CPT

## 2021-12-14 RX ADMIN — HEPARIN SODIUM 5000 UNIT(S): 5000 INJECTION INTRAVENOUS; SUBCUTANEOUS at 02:44

## 2021-12-14 RX ADMIN — Medication 100 MILLIGRAM(S): at 02:46

## 2021-12-14 NOTE — DISCHARGE NOTE PROVIDER - NSDCCPCAREPLAN_GEN_ALL_CORE_FT
PRINCIPAL DISCHARGE DIAGNOSIS  Diagnosis: Right lower quadrant abdominal pain  Assessment and Plan of Treatment:        PRINCIPAL DISCHARGE DIAGNOSIS  Diagnosis: Acute appendicitis  Assessment and Plan of Treatment:

## 2021-12-14 NOTE — DISCHARGE NOTE NURSING/CASE MANAGEMENT/SOCIAL WORK - PATIENT PORTAL LINK FT
You can access the FollowMyHealth Patient Portal offered by [facility] by registering at the following website: [email or web address]. By joining [facility]’s FollowMyHealth portal, you will also be able to view your health information using other applications (apps) compatible with our system.

## 2021-12-14 NOTE — DISCHARGE NOTE PROVIDER - NSDCFUADDINST_GEN_ALL_CORE_FT
General Discharge Instructions:  Please resume all regular home medications unless specifically advised not to take a particular medication. Also, please take any new medications as prescribed.  Please get plenty of rest, continue to ambulate several times per day, and drink adequate amounts of fluids. Avoid lifting weights greater than 5-10 lbs until you follow-up with your surgeon, who will instruct you further regarding activity restrictions.  Avoid driving or operating heavy machinery while taking pain medications.  Please follow-up with your surgeon Dr. Mason by calling [phone] to make an appointment. Primary Care Provider (PCP) as advised.  Incision Care:  *Please call your doctor if you have increased pain, swelling, redness, or drainage from the incision site.  *Avoid swimming and baths until your follow-up appointment.  *You may shower, and wash surgical incisions with a mild soap and warm water. Gently pat the area dry.  *If you have staples, they will be removed at your follow-up appointment.  *If you have steri-strips, they will fall off on their own. Please remove any remaining strips 7-10 days after surgery.     Warning Signs:  Please call your doctor if you experience the following:  *You experience new chest pain, pressure, squeezing or tightness.  *New or worsening cough, shortness of breath, or wheeze.  *If you are vomiting and cannot keep down fluids or your medications.  *You are getting dehydrated due to continued vomiting, diarrhea, or other reasons. Signs of dehydration include dry mouth, rapid heartbeat, or feeling dizzy or faint when standing.  *You see blood or dark/black material when you vomit or have a bowel movement.  *You experience burning when you urinate, have blood in your urine, or experience a discharge.  *Your pain is not improving within 8-12 hours or is not gone within 24 hours. Call or return immediately if your pain is getting worse, changes location, or moves to your chest or back.  *You have shaking chills, or fever greater than 101.5 degrees Fahrenheit or 38 degrees Celsius.  *Any change in your symptoms, or any new symptoms that concern you.  General Discharge Instructions:  Please resume all regular home medications unless specifically advised not to take a particular medication. Also, please take any new medications as prescribed. Please take 2 extra strength Tylenol and an Advil for pain every 6 hours.   Please get plenty of rest, continue to ambulate several times per day, and drink adequate amounts of fluids. Avoid lifting weights greater than 5-10 lbs until you follow-up with your surgeon, who will instruct you further regarding activity restrictions.  Avoid driving or operating heavy machinery while taking pain medications.  Please follow-up with your surgeon Dr. Mason by calling [phone] to make an appointment. Primary Care Provider (PCP) as advised.  Incision Care:  *Please call your doctor if you have increased pain, swelling, redness, or drainage from the incision site.  *Avoid swimming and baths until your follow-up appointment.  *You may shower, and wash surgical incisions with a mild soap and warm water. Gently pat the area dry.  *If you have staples, they will be removed at your follow-up appointment.  *If you have steri-strips, they will fall off on their own. Please remove any remaining strips 7-10 days after surgery.     Warning Signs:  Please call your doctor if you experience the following:  *You experience new chest pain, pressure, squeezing or tightness.  *New or worsening cough, shortness of breath, or wheeze.  *If you are vomiting and cannot keep down fluids or your medications.  *You are getting dehydrated due to continued vomiting, diarrhea, or other reasons. Signs of dehydration include dry mouth, rapid heartbeat, or feeling dizzy or faint when standing.  *You see blood or dark/black material when you vomit or have a bowel movement.  *You experience burning when you urinate, have blood in your urine, or experience a discharge.  *Your pain is not improving within 8-12 hours or is not gone within 24 hours. Call or return immediately if your pain is getting worse, changes location, or moves to your chest or back.  *You have shaking chills, or fever greater than 101.5 degrees Fahrenheit or 38 degrees Celsius.  *Any change in your symptoms, or any new symptoms that concern you.

## 2021-12-14 NOTE — PROGRESS NOTE ADULT - SUBJECTIVE AND OBJECTIVE BOX
24 hr events:     SUBJECTIVE:    Vital Signs Last 24 Hrs  T(C): 36.6 (14 Dec 2021 08:18), Max: 36.7 (13 Dec 2021 16:11)  T(F): 97.8 (14 Dec 2021 08:18), Max: 98.1 (13 Dec 2021 16:11)  HR: 64 (14 Dec 2021 08:18) (55 - 74)  BP: 109/71 (14 Dec 2021 08:18) (95/55 - 126/76)  BP(mean): --  RR: 17 (14 Dec 2021 08:18) (16 - 17)  SpO2: 98% (14 Dec 2021 08:18) (95% - 98%)    Physical Exam:  General: NAD  Pulmonary: Nonlabored breathing, no respiratory distress  Cardiovascular: NSR  Abdominal: soft, NT/ND, no organomegaly  Extremities: WWP, SCDs in place    Lines/drains/tubes:    I&O's Summary    13 Dec 2021 07:01  -  14 Dec 2021 07:00  --------------------------------------------------------  IN: 3000 mL / OUT: 1550 mL / NET: 1450 mL    14 Dec 2021 07:01  -  14 Dec 2021 09:40  --------------------------------------------------------  IN: 330 mL / OUT: 0 mL / NET: 330 mL        LABS:                        11.9   5.93  )-----------( 105      ( 14 Dec 2021 06:51 )             35.2     12-14    137  |  103  |  14  ----------------------------<  109<H>  4.2   |  23  |  0.80    Ca    9.0      14 Dec 2021 06:51  Phos  3.5     12-14  Mg     2.3     12-14    TPro  6.1  /  Alb  3.5  /  TBili  0.3  /  DBili  x   /  AST  26  /  ALT  53<H>  /  AlkPhos  89  12-14    PT/INR - ( 12 Dec 2021 18:00 )   PT: 14.2 sec;   INR: 1.19          PTT - ( 12 Dec 2021 18:00 )  PTT:29.9 sec  Urinalysis Basic - ( 12 Dec 2021 18:14 )    Color: Yellow / Appearance: SL Cloudy / S.025 / pH: x  Gluc: x / Ketone: 15 mg/dL  / Bili: Negative / Urobili: 0.2 E.U./dL   Blood: x / Protein: Trace mg/dL / Nitrite: NEGATIVE   Leuk Esterase: NEGATIVE / RBC: 0 /HPF / WBC < 5 /HPF   Sq Epi: x / Non Sq Epi: 0-5 /HPF / Bacteria: Present /HPF      CAPILLARY BLOOD GLUCOSE        LIVER FUNCTIONS - ( 14 Dec 2021 06:51 )  Alb: 3.5 g/dL / Pro: 6.1 g/dL / ALK PHOS: 89 U/L / ALT: 53 U/L / AST: 26 U/L / GGT: x             RADIOLOGY & ADDITIONAL STUDIES:     24 hr events:     SUBJECTIVE:    Vital Signs Last 24 Hrs  T(C): 36.6 (14 Dec 2021 08:18), Max: 36.7 (13 Dec 2021 16:11)  T(F): 97.8 (14 Dec 2021 08:18), Max: 98.1 (13 Dec 2021 16:11)  HR: 64 (14 Dec 2021 08:18) (55 - 74)  BP: 109/71 (14 Dec 2021 08:18) (95/55 - 126/76)  BP(mean): --  RR: 17 (14 Dec 2021 08:18) (16 - 17)  SpO2: 98% (14 Dec 2021 08:18) (95% - 98%)    Physical Exam:  General: NAD  Pulmonary: Nonlabored breathing, no respiratory distress  Cardiovascular: NSR  Abdominal: soft, NT/ND, abdominal incisions clean, dry and intact.   Extremities: WWP, SCDs in place    Lines/drains/tubes:    I&O's Summary    13 Dec 2021 07:01  -  14 Dec 2021 07:00  --------------------------------------------------------  IN: 3000 mL / OUT: 1550 mL / NET: 1450 mL    14 Dec 2021 07:01  -  14 Dec 2021 09:40  --------------------------------------------------------  IN: 330 mL / OUT: 0 mL / NET: 330 mL        LABS:                        11.9   5.93  )-----------( 105      ( 14 Dec 2021 06:51 )             35.2     -14    137  |  103  |  14  ----------------------------<  109<H>  4.2   |  23  |  0.80    Ca    9.0      14 Dec 2021 06:51  Phos  3.5     12-14  Mg     2.3         TPro  6.1  /  Alb  3.5  /  TBili  0.3  /  DBili  x   /  AST  26  /  ALT  53<H>  /  AlkPhos  89  12-14    PT/INR - ( 12 Dec 2021 18:00 )   PT: 14.2 sec;   INR: 1.19          PTT - ( 12 Dec 2021 18:00 )  PTT:29.9 sec  Urinalysis Basic - ( 12 Dec 2021 18:14 )    Color: Yellow / Appearance: SL Cloudy / S.025 / pH: x  Gluc: x / Ketone: 15 mg/dL  / Bili: Negative / Urobili: 0.2 E.U./dL   Blood: x / Protein: Trace mg/dL / Nitrite: NEGATIVE   Leuk Esterase: NEGATIVE / RBC: 0 /HPF / WBC < 5 /HPF   Sq Epi: x / Non Sq Epi: 0-5 /HPF / Bacteria: Present /HPF      CAPILLARY BLOOD GLUCOSE        LIVER FUNCTIONS - ( 14 Dec 2021 06:51 )  Alb: 3.5 g/dL / Pro: 6.1 g/dL / ALK PHOS: 89 U/L / ALT: 53 U/L / AST: 26 U/L / GGT: x             RADIOLOGY & ADDITIONAL STUDIES:

## 2021-12-14 NOTE — PROGRESS NOTE ADULT - ATTENDING COMMENTS
Doing well.  Afebrile.  Pain is controlled.  Start diet.  Possibly D/C home today.  Owen 5 days at discharge.

## 2021-12-14 NOTE — PROGRESS NOTE ADULT - SUBJECTIVE AND OBJECTIVE BOX
Patient is a 36y old  Male who presents with a chief complaint of Appendicitis  (14 Dec 2021 14:57)    INTERVAL EVENTS:  - Tolerating PO liquids this afternoon.   - no nausea   - urinating without dysuria     SUBJECTIVE:  Patient was seen and examined at bedside.    Review of systems: Patient denies: fever, chills, dizziness, HA, Changes in vision, CP, dyspnea, nausea or vomiting, dysuria, LE edema. Rest of 12 point Review of systems negative unless otherwise documented elsewhere in note.     MEDICATIONS:  MEDICATIONS  (STANDING):  BUpivacaine liposome 1.3% Injectable (no eMAR) 20 milliLiter(s) Local Injection once  cefTRIAXone   IVPB 1000 milliGRAM(s) IV Intermittent every 24 hours  heparin   Injectable 5000 Unit(s) SubCutaneous every 8 hours  lactated ringers. 1000 milliLiter(s) (60 mL/Hr) IV Continuous <Continuous>  metroNIDAZOLE  IVPB 500 milliGRAM(s) IV Intermittent every 8 hours    MEDICATIONS  (PRN):  acetaminophen     Tablet .. 650 milliGRAM(s) Oral every 6 hours PRN Mild Pain (1 - 3)      Allergies    No Known Allergies    Intolerances        OBJECTIVE:  Vital Signs Last 24 Hrs  T(C): 36.4 (14 Dec 2021 14:25), Max: 36.6 (14 Dec 2021 00:54)  T(F): 97.5 (14 Dec 2021 14:25), Max: 97.8 (14 Dec 2021 00:54)  HR: 65 (14 Dec 2021 14:25) (55 - 65)  BP: 116/71 (14 Dec 2021 14:25) (106/66 - 126/76)  BP(mean): --  RR: 18 (14 Dec 2021 14:25) (16 - 18)  SpO2: 97% (14 Dec 2021 14:25) (97% - 98%)  I&O's Summary    13 Dec 2021 07:01  -  14 Dec 2021 07:00  --------------------------------------------------------  IN: 3000 mL / OUT: 1550 mL / NET: 1450 mL    14 Dec 2021 07:01  -  14 Dec 2021 22:28  --------------------------------------------------------  IN: 1570 mL / OUT: 550 mL / NET: 1020 mL        PHYSICAL EXAM:  Gen: Reclining in bed at time of exam, appears stated age  HEENT: NCAT, MMM, clear OP  Neck: supple, trachea at midline  CV: RRR, +S1/S2  Pulm: adequate respiratory effort, no increase in work of breathing  Abd: soft, ND; minimal tenderness to light palpation; incision sites clean, dry, no oozing   Skin: warm and dry, no new rashes vs prior report  Ext: WWP, no LE edema  Neuro: AOx3, no gross focal neurological deficits  Psych: affect and behavior appropriate, pleasant at time of interview    LABS:                        11.9   5.93  )-----------( 105      ( 14 Dec 2021 06:51 )             35.2     12-14    137  |  103  |  14  ----------------------------<  109<H>  4.2   |  23  |  0.80    Ca    9.0      14 Dec 2021 06:51  Phos  3.5     12-14  Mg     2.3     12-14    TPro  6.1  /  Alb  3.5  /  TBili  0.3  /  DBili  x   /  AST  26  /  ALT  53<H>  /  AlkPhos  89  12-14    LIVER FUNCTIONS - ( 14 Dec 2021 06:51 )  Alb: 3.5 g/dL / Pro: 6.1 g/dL / ALK PHOS: 89 U/L / ALT: 53 U/L / AST: 26 U/L / GGT: x             CAPILLARY BLOOD GLUCOSE            MICRODATA:    Culture - Urine (collected 12 Dec 2021 18:28)  Source: Clean Catch Clean Catch (Midstream)  Final Report (13 Dec 2021 14:25):    Less than 10,000 cols/cc; Insignificant amount of growth.        RADIOLOGY/OTHER STUDIES:

## 2021-12-14 NOTE — DISCHARGE NOTE NURSING/CASE MANAGEMENT/SOCIAL WORK - NSDCPEFALRISK_GEN_ALL_CORE
For information on Fall & Injury Prevention, visit: [email or web address] OR  [email or web address] OR  https://www.cdc.gov/steadi/patient.html

## 2021-12-14 NOTE — PROGRESS NOTE ADULT - ASSESSMENT
36 year old man admitted for acute appendicitis     # Acute appendicitis   CT scan of abdomen showing dilated appendix and periappendiceal fat stranding  Clinical picture and imaging consistent with acute appendicitis  s/p appendectomy 12/13  Rest of management per primary team, advance diet as tolerated     # Leukocytosis   Likely 2/2 stress of acute appendicitis - resolved     # post-operative state  OOTB to chair   Encourage ambulation   Encourage incentive spirometry   Recommend miralax qd + senna PRN if patient develops constipation post-op    # Acute blood loss anemia   Hgb downtrended from time of admission  Hemoglobin: 11.9 g/dL (12-14-21 @ 06:51)  Hemoglobin: 12.9 g/dL (12-13-21 @ 12:35)  Hemoglobin: 12.6 g/dL (12-13-21 @ 05:52)  Hemoglobin: 15.0 g/dL (12-12-21 @ 16:08)   Partially attributable to operative blood losses   continue to trend daily CBC     # Mild -moderate thrombocytopenia   Platelet count improved 79 -->105.   possibly in setting of acute stress ;   does not yet meet transfusion threshold.     # Hypophosphatemia - Phosphorus Level, 2.4 on 12/13    ; Likely 2/2 poor PO intake Repleted Phos wnl today    # Acute kidney injury  Creatinine, Serum: 0.80 mg/dL (12-14-21)  Creatinine, Serum: 0.95 mg/dL (12-13-21)  Creatinine, Serum: 0.98 mg/dL (12-13-21)  Creatinine, Serum: 1.11 mg/dL (12-12-21)  Cr decreased by >0.3 this admission. Meets criteria for NATHANIEL.   likely pre-renal, encourage PO intake 
35 yo male with no significant PMH or PSH who presents with 3 day history of RLQ abdominal pain, associated with tactile fevers, chills, n/v and anorexia; currently afebrile, non-tachy, with soft pressure of 102/69 and PE with RLQ tenderness; WBC of11.28,  lactate of _, Cr 1.11, TB of 1.5 and mild LFT elevation; CT A/P demonstrated acute appendicitis; now POD 1 s/p lap appy (appendix hemorrhagic)    CLD/IVF/OOB/IS  continue ceftriaxone and flagyl   pain/nausea control   follow up for direct/indirect bilirubin   ppx scd/Heparin

## 2021-12-14 NOTE — DISCHARGE NOTE PROVIDER - CARE PROVIDER_API CALL
Isaac Mason)  Surgery  [address], [address]  [place], [place] [facility]  Phone: [phone]  Fax: [phone]  Follow Up Time:

## 2021-12-14 NOTE — DISCHARGE NOTE PROVIDER - HOSPITAL COURSE
37 yo male with no significant PMH or PSH who presents with 3 day history of RLQ abdominal pain, associated with tactile fevers, chills, n/v and anorexia; currently afebrile, non-tachy, with soft pressure of 102/69 and PE with RLQ tenderness; WBC of11.28,  lactate of _, Cr 1.11, TB of 1.5 and mild LFT elevation; CT A/P demonstrated acute appendicitis. On 12/13 he underwent laparoscopic appendectomy. He tolerated the procedure well and was transferred to the PACU in stable condition. He had no other complications on this admission. He will be discharged home in stable condition with 5 days of Augmentin.

## 2021-12-21 DIAGNOSIS — D69.6 THROMBOCYTOPENIA, UNSPECIFIED: ICD-10-CM

## 2021-12-21 DIAGNOSIS — K35.80 UNSPECIFIED ACUTE APPENDICITIS: ICD-10-CM

## 2021-12-21 DIAGNOSIS — R10.9 UNSPECIFIED ABDOMINAL PAIN: ICD-10-CM

## 2021-12-21 DIAGNOSIS — D62 ACUTE POSTHEMORRHAGIC ANEMIA: ICD-10-CM

## 2021-12-21 DIAGNOSIS — E83.39 OTHER DISORDERS OF PHOSPHORUS METABOLISM: ICD-10-CM

## 2021-12-21 DIAGNOSIS — K40.90 UNILATERAL INGUINAL HERNIA, WITHOUT OBSTRUCTION OR GANGRENE, NOT SPECIFIED AS RECURRENT: ICD-10-CM

## 2021-12-21 DIAGNOSIS — D72.829 ELEVATED WHITE BLOOD CELL COUNT, UNSPECIFIED: ICD-10-CM

## 2021-12-29 PROCEDURE — 82248 BILIRUBIN DIRECT: CPT

## 2021-12-29 PROCEDURE — U0003: CPT

## 2021-12-29 PROCEDURE — 86901 BLOOD TYPING SEROLOGIC RH(D): CPT

## 2021-12-29 PROCEDURE — 99285 EMERGENCY DEPT VISIT HI MDM: CPT | Mod: 25

## 2021-12-29 PROCEDURE — 74177 CT ABD & PELVIS W/CONTRAST: CPT | Mod: MA

## 2021-12-29 PROCEDURE — 36415 COLL VENOUS BLD VENIPUNCTURE: CPT

## 2021-12-29 PROCEDURE — 85025 COMPLETE CBC W/AUTO DIFF WBC: CPT

## 2021-12-29 PROCEDURE — 85730 THROMBOPLASTIN TIME PARTIAL: CPT

## 2021-12-29 PROCEDURE — 85027 COMPLETE CBC AUTOMATED: CPT

## 2021-12-29 PROCEDURE — 96374 THER/PROPH/DIAG INJ IV PUSH: CPT

## 2021-12-29 PROCEDURE — 83735 ASSAY OF MAGNESIUM: CPT

## 2021-12-29 PROCEDURE — 82247 BILIRUBIN TOTAL: CPT

## 2021-12-29 PROCEDURE — 86850 RBC ANTIBODY SCREEN: CPT

## 2021-12-29 PROCEDURE — 88304 TISSUE EXAM BY PATHOLOGIST: CPT

## 2021-12-29 PROCEDURE — U0005: CPT

## 2021-12-29 PROCEDURE — 84100 ASSAY OF PHOSPHORUS: CPT

## 2021-12-29 PROCEDURE — 80053 COMPREHEN METABOLIC PANEL: CPT

## 2021-12-29 PROCEDURE — 81001 URINALYSIS AUTO W/SCOPE: CPT

## 2021-12-29 PROCEDURE — 87086 URINE CULTURE/COLONY COUNT: CPT

## 2021-12-29 PROCEDURE — 83690 ASSAY OF LIPASE: CPT

## 2021-12-29 PROCEDURE — 80048 BASIC METABOLIC PNL TOTAL CA: CPT

## 2021-12-29 PROCEDURE — 86900 BLOOD TYPING SEROLOGIC ABO: CPT

## 2021-12-29 PROCEDURE — 87389 HIV-1 AG W/HIV-1&-2 AB AG IA: CPT

## 2021-12-29 PROCEDURE — 96375 TX/PRO/DX INJ NEW DRUG ADDON: CPT

## 2021-12-29 PROCEDURE — 85610 PROTHROMBIN TIME: CPT

## 2022-01-13 LAB — SURGICAL PATHOLOGY STUDY: SIGNIFICANT CHANGE UP
